# Patient Record
Sex: FEMALE | Race: WHITE | NOT HISPANIC OR LATINO | Employment: FULL TIME | URBAN - METROPOLITAN AREA
[De-identification: names, ages, dates, MRNs, and addresses within clinical notes are randomized per-mention and may not be internally consistent; named-entity substitution may affect disease eponyms.]

---

## 2021-05-24 ENCOUNTER — TRANSFERRED RECORDS (OUTPATIENT)
Dept: HEALTH INFORMATION MANAGEMENT | Facility: CLINIC | Age: 56
End: 2021-05-24

## 2021-05-24 LAB
ALT SERPL-CCNC: 18 IU/L (ref 6–31)
AST SERPL-CCNC: 22 IU/L (ref 10–40)
CREAT SERPL-MCNC: 0.72 MG/DL (ref 0.4–1)
GFR SERPL CREATININE-BSD FRML MDRD: >60 ML/MIN/1.73M2
GLUCOSE SERPL-MCNC: 144 MG/DL (ref 70–99)
INR PPP: 1.2 (ref 0.9–1.1)
POTASSIUM SERPL-SCNC: 4.9 MEQ/L (ref 3.4–5.1)

## 2021-05-26 ENCOUNTER — APPOINTMENT (OUTPATIENT)
Dept: GENERAL RADIOLOGY | Facility: CLINIC | Age: 56
End: 2021-05-26
Attending: SURGERY
Payer: COMMERCIAL

## 2021-05-26 ENCOUNTER — HOSPITAL ENCOUNTER (INPATIENT)
Facility: CLINIC | Age: 56
LOS: 3 days | Discharge: HOME | End: 2021-05-29
Attending: SURGERY | Admitting: SURGERY
Payer: COMMERCIAL

## 2021-05-26 ENCOUNTER — TRANSFERRED RECORDS (OUTPATIENT)
Dept: HEALTH INFORMATION MANAGEMENT | Facility: CLINIC | Age: 56
End: 2021-05-26

## 2021-05-26 DIAGNOSIS — K28.9 MARGINAL ULCER: Primary | ICD-10-CM

## 2021-05-26 LAB
ANION GAP SERPL CALCULATED.3IONS-SCNC: 6 MMOL/L (ref 3–14)
BUN SERPL-MCNC: 5 MG/DL (ref 7–30)
CALCIUM SERPL-MCNC: 8.5 MG/DL (ref 8.5–10.1)
CHLORIDE SERPL-SCNC: 109 MMOL/L (ref 94–109)
CO2 SERPL-SCNC: 25 MMOL/L (ref 20–32)
CREAT SERPL-MCNC: 0.52 MG/DL (ref 0.52–1.04)
ERYTHROCYTE [DISTWIDTH] IN BLOOD BY AUTOMATED COUNT: 12 % (ref 10–15)
GFR SERPL CREATININE-BSD FRML MDRD: >90 ML/MIN/{1.73_M2}
GLUCOSE SERPL-MCNC: 88 MG/DL (ref 70–99)
HCT VFR BLD AUTO: 26.6 % (ref 35–47)
HGB BLD-MCNC: 8.9 G/DL (ref 11.7–15.7)
INR PPP: 1.08 (ref 0.86–1.14)
MAGNESIUM SERPL-MCNC: 1.8 MG/DL (ref 1.6–2.3)
MCH RBC QN AUTO: 30.8 PG (ref 26.5–33)
MCHC RBC AUTO-ENTMCNC: 33.5 G/DL (ref 31.5–36.5)
MCV RBC AUTO: 92 FL (ref 78–100)
PHOSPHATE SERPL-MCNC: 3.1 MG/DL (ref 2.5–4.5)
PLATELET # BLD AUTO: 319 10E9/L (ref 150–450)
POTASSIUM SERPL-SCNC: 3.5 MMOL/L (ref 3.4–5.3)
RBC # BLD AUTO: 2.89 10E12/L (ref 3.8–5.2)
SODIUM SERPL-SCNC: 140 MMOL/L (ref 133–144)
WBC # BLD AUTO: 5.9 10E9/L (ref 4–11)

## 2021-05-26 PROCEDURE — C9113 INJ PANTOPRAZOLE SODIUM, VIA: HCPCS

## 2021-05-26 PROCEDURE — 84100 ASSAY OF PHOSPHORUS: CPT

## 2021-05-26 PROCEDURE — 80048 BASIC METABOLIC PNL TOTAL CA: CPT

## 2021-05-26 PROCEDURE — 71045 X-RAY EXAM CHEST 1 VIEW: CPT

## 2021-05-26 PROCEDURE — 120N000002 HC R&B MED SURG/OB UMMC

## 2021-05-26 PROCEDURE — 250N000013 HC RX MED GY IP 250 OP 250 PS 637

## 2021-05-26 PROCEDURE — 258N000003 HC RX IP 258 OP 636

## 2021-05-26 PROCEDURE — 250N000011 HC RX IP 250 OP 636

## 2021-05-26 PROCEDURE — 83735 ASSAY OF MAGNESIUM: CPT

## 2021-05-26 PROCEDURE — 71045 X-RAY EXAM CHEST 1 VIEW: CPT | Mod: 26 | Performed by: RADIOLOGY

## 2021-05-26 PROCEDURE — 36415 COLL VENOUS BLD VENIPUNCTURE: CPT

## 2021-05-26 PROCEDURE — 85027 COMPLETE CBC AUTOMATED: CPT

## 2021-05-26 PROCEDURE — 85610 PROTHROMBIN TIME: CPT

## 2021-05-26 RX ORDER — ONDANSETRON 2 MG/ML
4 INJECTION INTRAMUSCULAR; INTRAVENOUS EVERY 6 HOURS PRN
Status: DISCONTINUED | OUTPATIENT
Start: 2021-05-26 | End: 2021-05-29 | Stop reason: HOSPADM

## 2021-05-26 RX ORDER — SODIUM CHLORIDE, SODIUM LACTATE, POTASSIUM CHLORIDE, CALCIUM CHLORIDE 600; 310; 30; 20 MG/100ML; MG/100ML; MG/100ML; MG/100ML
INJECTION, SOLUTION INTRAVENOUS CONTINUOUS
Status: DISCONTINUED | OUTPATIENT
Start: 2021-05-26 | End: 2021-05-27

## 2021-05-26 RX ORDER — ONDANSETRON 4 MG/1
4 TABLET, ORALLY DISINTEGRATING ORAL EVERY 6 HOURS PRN
Status: DISCONTINUED | OUTPATIENT
Start: 2021-05-26 | End: 2021-05-29 | Stop reason: HOSPADM

## 2021-05-26 RX ORDER — HYDROMORPHONE HCL IN WATER/PF 6 MG/30 ML
.2-.4 PATIENT CONTROLLED ANALGESIA SYRINGE INTRAVENOUS
Status: DISCONTINUED | OUTPATIENT
Start: 2021-05-26 | End: 2021-05-29 | Stop reason: HOSPADM

## 2021-05-26 RX ORDER — NALOXONE HYDROCHLORIDE 0.4 MG/ML
0.2 INJECTION, SOLUTION INTRAMUSCULAR; INTRAVENOUS; SUBCUTANEOUS
Status: DISCONTINUED | OUTPATIENT
Start: 2021-05-26 | End: 2021-05-29 | Stop reason: HOSPADM

## 2021-05-26 RX ORDER — PIPERACILLIN SODIUM, TAZOBACTAM SODIUM 3; .375 G/15ML; G/15ML
3.38 INJECTION, POWDER, LYOPHILIZED, FOR SOLUTION INTRAVENOUS EVERY 6 HOURS
Status: DISCONTINUED | OUTPATIENT
Start: 2021-05-26 | End: 2021-05-27

## 2021-05-26 RX ORDER — NALOXONE HYDROCHLORIDE 0.4 MG/ML
0.4 INJECTION, SOLUTION INTRAMUSCULAR; INTRAVENOUS; SUBCUTANEOUS
Status: DISCONTINUED | OUTPATIENT
Start: 2021-05-26 | End: 2021-05-29 | Stop reason: HOSPADM

## 2021-05-26 RX ORDER — LIDOCAINE 4 G/G
1 PATCH TOPICAL
Status: DISCONTINUED | OUTPATIENT
Start: 2021-05-26 | End: 2021-05-29 | Stop reason: HOSPADM

## 2021-05-26 RX ADMIN — SODIUM CHLORIDE 8 MG/HR: 9 INJECTION, SOLUTION INTRAVENOUS at 23:40

## 2021-05-26 RX ADMIN — PIPERACILLIN SODIUM AND TAZOBACTAM SODIUM 3.38 G: 3; .375 INJECTION, POWDER, LYOPHILIZED, FOR SOLUTION INTRAVENOUS at 23:41

## 2021-05-26 RX ADMIN — LIDOCAINE 1 PATCH: 246 PATCH TOPICAL at 23:41

## 2021-05-26 RX ADMIN — HYDROMORPHONE HYDROCHLORIDE 0.2 MG: 0.2 INJECTION, SOLUTION INTRAMUSCULAR; INTRAVENOUS; SUBCUTANEOUS at 23:40

## 2021-05-26 RX ADMIN — SODIUM CHLORIDE, POTASSIUM CHLORIDE, SODIUM LACTATE AND CALCIUM CHLORIDE: 600; 310; 30; 20 INJECTION, SOLUTION INTRAVENOUS at 23:40

## 2021-05-26 ASSESSMENT — MIFFLIN-ST. JEOR: SCORE: 1353

## 2021-05-26 NOTE — H&P
"SURGERY ADMISSION HISTORY & PHYSICAL  Ngoc Azar MRN# [id number]   YOB: 1965 Age: 55 year old     Date of Admission: 05/26/21    CC: Upper GI Bleed    HPI: Ngoc Azar is a 55 year old year old female with a a previous Bruce-en-Y in 2011 and revised in 2020 due to a perorated gastrojejunal anastomosis ulcer and an open cholecystectomy in 2020, who presented to OSH on 5/24 with hematemesis and hematochezia resulting in hypotension, found to have a marginal ulcer at R-E-Y anastomosis. At outside hospital patient did not require blood transfusions. She was started on a PPI drip and had an EGD which showed moderate stenosis and a cratered marginal ulcer on jejunal aspect of the anastomosis with adherent clot and likely underlying vessel extending into sinus cavity near bruce limb. Currently she denies any feelings of chest pain, SOB, dizziness, nausea, vomiting, abdominal pain or diarrhea. She has not had any more episodes of hematochezia or hematemesis. She has a     REVIEW OF SYSTEMS: Negative unless otherwise specified in HPI    PAST MEDICAL HISTORY: Depression, Previous R-E-Y with revisions for ulcers    PAST SURGICAL HISTORY: R-E-Y in 2011, revision due to ulcer in 2020; open cholecystectomy in 2020.    ALLERGIES:  Sulfa drugs    HOME MEDICATIONS:   Tylenol 500mg TID prn  Atenolol 50mg Dialy  Rexulti 0.5mg qevening  Ferrous sulfate 325mg BID  Prozac 40mg daily  Atarax 50mg qevening  Oxycodone 5mg q4 prn  Prilosec 20mg BID      SOCIAL HISTORY:   Former Smoker 0.25 packs/day  Alcohol 2-3 drinks a week  No other drugs    FAMILY HISTORY: No family history on file.    PHYSICAL EXAMINATION:  BP (!) (P) 143/79   Pulse 82   Temp 97.9  F (36.6  C) (Oral)   Resp 16   Ht 1.626 m (5' 4\")   Wt 77.8 kg (171 lb 8.3 oz)   SpO2 97%   BMI 29.44 kg/m       Awake and alert  RRR  Non labored breathing  Abdomen is soft, non distended, non tender, previous well healed R subcostal incision and midline " incision.    LABS:  CBC RESULTS:   Recent Labs   Lab Test 05/26/21  2250   WBC 5.9   RBC 2.89*   HGB 8.9*   HCT 26.6*   MCV 92   MCH 30.8   MCHC 33.5   RDW 12.0        Last Comprehensive Metabolic Panel:  Sodium   Date Value Ref Range Status   05/26/2021 140 133 - 144 mmol/L Final     Potassium   Date Value Ref Range Status   05/26/2021 3.5 3.4 - 5.3 mmol/L Final     Chloride   Date Value Ref Range Status   05/26/2021 109 94 - 109 mmol/L Final     Carbon Dioxide   Date Value Ref Range Status   05/26/2021 25 20 - 32 mmol/L Final     Anion Gap   Date Value Ref Range Status   05/26/2021 6 3 - 14 mmol/L Final     Glucose   Date Value Ref Range Status   05/26/2021 88 70 - 99 mg/dL Final     Urea Nitrogen   Date Value Ref Range Status   05/26/2021 5 (L) 7 - 30 mg/dL Final     Creatinine   Date Value Ref Range Status   05/26/2021 0.52 0.52 - 1.04 mg/dL Final     GFR Estimate   Date Value Ref Range Status   05/26/2021 >90 >60 mL/min/[1.73_m2] Final     Comment:     Non  GFR Calc  Starting 12/18/2018, serum creatinine based estimated GFR (eGFR) will be   calculated using the Chronic Kidney Disease Epidemiology Collaboration   (CKD-EPI) equation.       Calcium   Date Value Ref Range Status   05/26/2021 8.5 8.5 - 10.1 mg/dL Final         IMAGING:  CT abdomen/pelvis w/IV contrast 5/24/21 at OSH:  FINDINGS: Postoperative changes Eve-en-Y gastric bypass. There is a focal outpouching involving the proximal Eve limb near the gastroenterostomy. There is mild fat stranding and edema about this. No sid free fluid. No extraluminal gas.    Normal caliber small bowel. Normal appendix. Surgical clips about the gallbladder. Punctate right renal calyceal stone. No hydronephrosis. Renal cortical cysts. Liver, spleen, adrenal glands and pancreas are negative. Scattered arterial calcifications.    IMPRESSION: Focal outpouching of the proximal Eve limb near the gastroenterostomy. There is some mild inflammatory  change about this. Findings are suspicious for a marginal ulcer. No free fluid or free air. Recommend EGD for further evaluation.    A/P: Ngoc Azar is a 55 year old female with a hx of a R-E-Y in 2011 with revision in 2020, who presented to OSH on 5/24/21 after episodes of hematemesis and hematochezia with dizziness per patient. She was started on PPI drip at OSH and underwent an EGD which showed moderate stenosis and a cratered marginal ulcer on jejunal aspect of the anastomosis with adherent clot and likely underlying vessel extending into sinus cavity near bruce limb. Patient has not had any more episodes of hematochezia or hematemesis, and has not required blood transfusions. She has been transferred to [facility] for further evaluation given complex bariatric history.    - Admit to surgery  - Strict NPO  - IV dilaudid for pain  - PPI drip  - Lidocaine patches for pain  - Labs now  - d5 1/2 NS + 20k  @100  - IV zosyn    Discussed with rajani resident Dr. Todd. Also discussed  With chief resident Dr. Martel who will discuss with staff     [place]

## 2021-05-27 LAB
GLUCOSE BLDC GLUCOMTR-MCNC: 107 MG/DL (ref 70–99)
GLUCOSE BLDC GLUCOMTR-MCNC: 110 MG/DL (ref 70–99)
GLUCOSE BLDC GLUCOMTR-MCNC: 113 MG/DL (ref 70–99)
GLUCOSE BLDC GLUCOMTR-MCNC: 118 MG/DL (ref 70–99)
GLUCOSE BLDC GLUCOMTR-MCNC: 119 MG/DL (ref 70–99)
UPPER GI ENDOSCOPY: NORMAL

## 2021-05-27 PROCEDURE — 250N000011 HC RX IP 250 OP 636: Performed by: SURGERY

## 2021-05-27 PROCEDURE — 999N001017 HC STATISTIC GLUCOSE BY METER IP

## 2021-05-27 PROCEDURE — 250N000013 HC RX MED GY IP 250 OP 250 PS 637

## 2021-05-27 PROCEDURE — 43235 EGD DIAGNOSTIC BRUSH WASH: CPT | Performed by: SURGERY

## 2021-05-27 PROCEDURE — 0DJ08ZZ INSPECTION OF UPPER INTESTINAL TRACT, VIA NATURAL OR ARTIFICIAL OPENING ENDOSCOPIC: ICD-10-PCS | Performed by: SURGERY

## 2021-05-27 PROCEDURE — C9113 INJ PANTOPRAZOLE SODIUM, VIA: HCPCS

## 2021-05-27 PROCEDURE — 120N000002 HC R&B MED SURG/OB UMMC

## 2021-05-27 PROCEDURE — G0500 MOD SEDAT ENDO SERVICE >5YRS: HCPCS | Performed by: SURGERY

## 2021-05-27 PROCEDURE — 250N000009 HC RX 250: Performed by: SURGERY

## 2021-05-27 PROCEDURE — 258N000003 HC RX IP 258 OP 636

## 2021-05-27 PROCEDURE — 250N000011 HC RX IP 250 OP 636

## 2021-05-27 RX ORDER — HYDRALAZINE HYDROCHLORIDE 20 MG/ML
10 INJECTION INTRAMUSCULAR; INTRAVENOUS EVERY 6 HOURS PRN
Status: DISCONTINUED | OUTPATIENT
Start: 2021-05-27 | End: 2021-05-29 | Stop reason: HOSPADM

## 2021-05-27 RX ORDER — DEXTROSE MONOHYDRATE, SODIUM CHLORIDE, AND POTASSIUM CHLORIDE 50; 1.49; 4.5 G/1000ML; G/1000ML; G/1000ML
INJECTION, SOLUTION INTRAVENOUS CONTINUOUS
Status: DISCONTINUED | OUTPATIENT
Start: 2021-05-27 | End: 2021-05-29 | Stop reason: HOSPADM

## 2021-05-27 RX ORDER — FENTANYL CITRATE 50 UG/ML
INJECTION, SOLUTION INTRAMUSCULAR; INTRAVENOUS PRN
Status: COMPLETED | OUTPATIENT
Start: 2021-05-27 | End: 2021-05-27

## 2021-05-27 RX ORDER — DIPHENHYDRAMINE HYDROCHLORIDE 50 MG/ML
25 INJECTION INTRAMUSCULAR; INTRAVENOUS
Status: DISCONTINUED | OUTPATIENT
Start: 2021-05-27 | End: 2021-05-29 | Stop reason: HOSPADM

## 2021-05-27 RX ADMIN — FENTANYL CITRATE 100 MCG: 50 INJECTION, SOLUTION INTRAMUSCULAR; INTRAVENOUS at 11:15

## 2021-05-27 RX ADMIN — HYDROMORPHONE HYDROCHLORIDE 0.4 MG: 0.2 INJECTION, SOLUTION INTRAMUSCULAR; INTRAVENOUS; SUBCUTANEOUS at 01:41

## 2021-05-27 RX ADMIN — POTASSIUM CHLORIDE, DEXTROSE MONOHYDRATE AND SODIUM CHLORIDE: 150; 5; 450 INJECTION, SOLUTION INTRAVENOUS at 10:22

## 2021-05-27 RX ADMIN — PIPERACILLIN SODIUM AND TAZOBACTAM SODIUM 3.38 G: 3; .375 INJECTION, POWDER, LYOPHILIZED, FOR SOLUTION INTRAVENOUS at 05:15

## 2021-05-27 RX ADMIN — LIDOCAINE 1 PATCH: 246 PATCH TOPICAL at 21:31

## 2021-05-27 RX ADMIN — PIPERACILLIN SODIUM AND TAZOBACTAM SODIUM 3.38 G: 3; .375 INJECTION, POWDER, LYOPHILIZED, FOR SOLUTION INTRAVENOUS at 10:15

## 2021-05-27 RX ADMIN — MIDAZOLAM 1 MG: 1 INJECTION INTRAMUSCULAR; INTRAVENOUS at 11:18

## 2021-05-27 RX ADMIN — PANTOPRAZOLE SODIUM 80 MG: 40 INJECTION, POWDER, FOR SOLUTION INTRAVENOUS at 13:36

## 2021-05-27 RX ADMIN — POTASSIUM CHLORIDE, DEXTROSE MONOHYDRATE AND SODIUM CHLORIDE: 150; 5; 450 INJECTION, SOLUTION INTRAVENOUS at 00:52

## 2021-05-27 RX ADMIN — POTASSIUM CHLORIDE, DEXTROSE MONOHYDRATE AND SODIUM CHLORIDE: 150; 5; 450 INJECTION, SOLUTION INTRAVENOUS at 21:11

## 2021-05-27 RX ADMIN — TOPICAL ANESTHETIC 1 SPRAY: 200 SPRAY DENTAL; PERIODONTAL at 11:14

## 2021-05-27 RX ADMIN — HYDROMORPHONE HYDROCHLORIDE 0.4 MG: 0.2 INJECTION, SOLUTION INTRAMUSCULAR; INTRAVENOUS; SUBCUTANEOUS at 08:58

## 2021-05-27 RX ADMIN — PANTOPRAZOLE SODIUM 80 MG: 40 INJECTION, POWDER, FOR SOLUTION INTRAVENOUS at 21:10

## 2021-05-27 RX ADMIN — SODIUM CHLORIDE 8 MG/HR: 9 INJECTION, SOLUTION INTRAVENOUS at 06:53

## 2021-05-27 RX ADMIN — MIDAZOLAM 1 MG: 1 INJECTION INTRAMUSCULAR; INTRAVENOUS at 11:30

## 2021-05-27 RX ADMIN — MIDAZOLAM 1 MG: 1 INJECTION INTRAMUSCULAR; INTRAVENOUS at 11:24

## 2021-05-27 RX ADMIN — MIDAZOLAM 2 MG: 1 INJECTION INTRAMUSCULAR; INTRAVENOUS at 11:15

## 2021-05-27 ASSESSMENT — MIFFLIN-ST. JEOR: SCORE: 1342.49

## 2021-05-27 ASSESSMENT — ACTIVITIES OF DAILY LIVING (ADL)
ADLS_ACUITY_SCORE: 17
ADLS_ACUITY_SCORE: 15

## 2021-05-27 NOTE — PLAN OF CARE
"BP (!) 146/78 (BP Location: Right arm)   Pulse 70   Temp 97.3  F (36.3  C) (Oral)   Resp 16   Ht 1.626 m (5' 4\")   Wt 77.8 kg (171 lb 8.3 oz)   SpO2 100%   BMI 29.44 kg/m      Had EGD today in Endoscopy Department     Neuro: A&Ox4.   Cardiac: AVSS.   Respiratory: Sating 100% on RA.  GI/: Good urine output. No BM  Diet/appetite: Strict NPO  Activity:  Up to bathroom to void independently   Pain: Back and headache pain managed with dilaudid IV 0.4mg - given x 1  Skin: Old scar on abdomen Scabs on bilateral shin   LDA's:  PIV x2 with D5 1/2 NS + 20KCL at 100ml/hr   ,110   Plan for another EGD tomorrow in the OR    Plan: Continue with POC. Notify primary team with changes.      "

## 2021-05-27 NOTE — PROGRESS NOTES
Surgery Progress Note  5/27/2021     Subjective:  -pain well controlled. No N/V. UGI perforation repaired outside hospital in the past, pt unsure where it was, but it was done at [place]. VS wnl.     Objective:  Temp:  [97.9  F (36.6  C)] 97.9  F (36.6  C)  Pulse:  [82] 82  Resp:  [16] 16  BP: (P) 143/79  SpO2:  [97 %] 97 %  No intake/output data recorded.    Gen: Awake, alert, NAD   Resp: NLB on RA  Abd: Soft, non-distended, non-tender  Ext: WWP      BMP  Recent Labs   Lab 05/26/21  2250      POTASSIUM 3.5   CHLORIDE 109   KARINE 8.5   CO2 25   BUN 5*   CR 0.52   GLC 88   MAG 1.8   PHOS 3.1     CBC  Recent Labs   Lab 05/26/21  2250   WBC 5.9   RBC 2.89*   HGB 8.9*   HCT 26.6*   MCV 92   MCH 30.8   MCHC 33.5   RDW 12.0        INR  Recent Labs   Lab 05/26/21  2250   INR 1.08      AST/ALT & Alk PhosNo lab results found in last 7 days.  BiliNo results for input(s): BILITOTAL, DBIL in the last [id number] hours.  Lipase/AmlyaseNo lab results found in last 7 days.    A/P: Ngoc Azar is a 55 year old female with PMH of RNYGB 2011 w/ revision in 2020 who presents as a transfer from OSH on 5/27 with hematemesis and found to have a marginal ulcer. EGD showed moderate stenosis and a cratered marginal ulcer on jejunal aspect of anastomosis w/ adherent clot and likely underlying vessel extending into sinus cavity near bruce limb. Transferred to [facility] for complex bariatric history.     - Plan for EGD today at 11 AM  - PPI gtt  - Strict NPO, IVF  - Pain control  - Discontinue zosyn    Seen with chief, Dr. Pan who discussed with staff.    Antonio Lopes MD  EGS PGY2

## 2021-05-27 NOTE — OR NURSING
Procedure: Upper endoscopy using endo scissors and rat tooth to remove suture. MD will take patient to the OR on Friday to remove more of the suture.   Sedation: Conscious sedation (100 mcg fentanyl, 5 mg versed)  O2: 2 LPM NC during procedure; room air  Tolerated: VS stable during and post procedure. Not c/o abdominal or chest pain.  Report: Given to   Flora Carrillo RN     Registered Nurse     Since 5/27/2021     [id number]   Patient escorted to and from endoscopy clinic via litter by patient transport    Sugey Strauss RN

## 2021-05-27 NOTE — PLAN OF CARE
"BP (!) (P) 143/79   Pulse 82   Temp 97.9  F (36.6  C) (Oral)   Resp 16   Ht 1.626 m (5' 4\")   Wt 77.8 kg (171 lb 8.3 oz)   SpO2 97%   BMI 29.44 kg/m      Shift- [id number]    Neuros:A&Ox4. Very calm and pleasant,    Cardiac: WDL. Denies any chest pain.  Respiratory: WDL. LS clear and equal. Denies SOB.   GI/Gu: Voiding spontaneously.Denies any abdominal pain. LBM was 5/25 before she was brought here.   Skin/Incisions: Has some scabs bilaterally on shins.   Pain: Pain in lower back and hip pain. PRN dilaudid and lidocaine patch in place.   Diet: Strict NPO.   Activity: Independent.  Plan:  Continue with POC. Waiting for plan.   "

## 2021-05-27 NOTE — UTILIZATION REVIEW
Inpatient appropriate    Admission Status; Secondary Review Determination      Under the authority of the Utilization Management Committee, the utilization review process indicated a secondary review on the above patient. The review outcome is based on review of the medical records, discussions with staff, and applying clinical experience noted on the date of the review.    (x) Inpatient Status Appropriate - This patient's medical care is consistent with medical management for inpatient care and reasonable inpatient medical practice.    RATIONALE FOR DETERMINATION  55-year-old female with previous Eve-en-Y in 2011 and revision in 2020 due to a perforated gastrojejunal anastomosis ulcer presented initially to outside hospital with hematemesis and hematochezia, was found to have moderate stenosis and a cratered marginal ulcer on general aspect of the anastomosis with adherent clot and likely underlying vessel extending into sinus cavity near the Eve limb.  Given her past complex bariatric surgical history she was transferred to [facility] where she was admitted on 5/26/2021.  Patient is currently on IV fluids, IV Protonix drip and IV Zosyn.  Repeat endoscopy is being planned for today.  She will need ongoing hospitalization for close monitoring of hemoglobin, and likely will need revision of her Eve-en-Y.  Inpatient care is appropriate at this time.    At the time of admission with the information available to the attending physician more than 2 nights Hospital complex care was anticipated, based on patient risk of adverse outcome if treated as outpatient and complex care required. Inpatient admission is appropriate based on the Medicare guidelines.    This document was produced using voice recognition software      The information on this document is developed by the utilization review team in order for the business office to ensure compliance. This only denotes the appropriateness of proper admission status and does  not reflect the quality of care rendered.  The definitions of Inpatient Status and Observation Status used in making the determination above are those provided in the CMS Coverage Manual, Chapter 1 and Chapter 6, section 70.4.    Sincerely,    Utilization Review  Physician Advisor  [facility].

## 2021-05-27 NOTE — PROGRESS NOTES
Admitted/transferred from: [facility]  2 RN full   skin assessment completed by Jessica Ramos, IMANI and Ulisses Sepulveda, RN.  Skin assessment finding: issues found Small scabs on shins bilaterally.    Interventions/actions: other None.      Will continue to monitor.

## 2021-05-28 ENCOUNTER — ANESTHESIA (OUTPATIENT)
Dept: SURGERY | Facility: CLINIC | Age: 56
End: 2021-05-28
Payer: COMMERCIAL

## 2021-05-28 ENCOUNTER — ANESTHESIA EVENT (OUTPATIENT)
Dept: SURGERY | Facility: CLINIC | Age: 56
End: 2021-05-28
Payer: COMMERCIAL

## 2021-05-28 LAB
ANION GAP SERPL CALCULATED.3IONS-SCNC: 4 MMOL/L (ref 3–14)
BUN SERPL-MCNC: 2 MG/DL (ref 7–30)
CALCIUM SERPL-MCNC: 8.4 MG/DL (ref 8.5–10.1)
CHLORIDE SERPL-SCNC: 107 MMOL/L (ref 94–109)
CO2 SERPL-SCNC: 27 MMOL/L (ref 20–32)
CREAT SERPL-MCNC: 0.64 MG/DL (ref 0.52–1.04)
ERYTHROCYTE [DISTWIDTH] IN BLOOD BY AUTOMATED COUNT: 12.2 % (ref 10–15)
GFR SERPL CREATININE-BSD FRML MDRD: >90 ML/MIN/{1.73_M2}
GLUCOSE BLDC GLUCOMTR-MCNC: 113 MG/DL (ref 70–99)
GLUCOSE BLDC GLUCOMTR-MCNC: 134 MG/DL (ref 70–99)
GLUCOSE BLDC GLUCOMTR-MCNC: 162 MG/DL (ref 70–99)
GLUCOSE SERPL-MCNC: 110 MG/DL (ref 70–99)
HCT VFR BLD AUTO: 27.3 % (ref 35–47)
HGB BLD-MCNC: 9.1 G/DL (ref 11.7–15.7)
MAGNESIUM SERPL-MCNC: 1.8 MG/DL (ref 1.6–2.3)
MCH RBC QN AUTO: 31 PG (ref 26.5–33)
MCHC RBC AUTO-ENTMCNC: 33.3 G/DL (ref 31.5–36.5)
MCV RBC AUTO: 93 FL (ref 78–100)
PHOSPHATE SERPL-MCNC: 4.1 MG/DL (ref 2.5–4.5)
PLATELET # BLD AUTO: 333 10E9/L (ref 150–450)
POTASSIUM SERPL-SCNC: 3.8 MMOL/L (ref 3.4–5.3)
RBC # BLD AUTO: 2.94 10E12/L (ref 3.8–5.2)
SODIUM SERPL-SCNC: 138 MMOL/L (ref 133–144)
WBC # BLD AUTO: 4.3 10E9/L (ref 4–11)

## 2021-05-28 PROCEDURE — 258N000003 HC RX IP 258 OP 636

## 2021-05-28 PROCEDURE — 88304 TISSUE EXAM BY PATHOLOGIST: CPT | Mod: 26 | Performed by: PATHOLOGY

## 2021-05-28 PROCEDURE — 360N000075 HC SURGERY LEVEL 2, PER MIN: Performed by: SURGERY

## 2021-05-28 PROCEDURE — 370N000017 HC ANESTHESIA TECHNICAL FEE, PER MIN: Performed by: SURGERY

## 2021-05-28 PROCEDURE — 0DCA8ZZ EXTIRPATION OF MATTER FROM JEJUNUM, VIA NATURAL OR ARTIFICIAL OPENING ENDOSCOPIC: ICD-10-PCS | Performed by: SURGERY

## 2021-05-28 PROCEDURE — 36415 COLL VENOUS BLD VENIPUNCTURE: CPT

## 2021-05-28 PROCEDURE — 272N000002 HC OR SUPPLY OTHER OPNP: Performed by: SURGERY

## 2021-05-28 PROCEDURE — 83735 ASSAY OF MAGNESIUM: CPT

## 2021-05-28 PROCEDURE — 80048 BASIC METABOLIC PNL TOTAL CA: CPT

## 2021-05-28 PROCEDURE — 250N000011 HC RX IP 250 OP 636

## 2021-05-28 PROCEDURE — 999N001017 HC STATISTIC GLUCOSE BY METER IP

## 2021-05-28 PROCEDURE — 710N000010 HC RECOVERY PHASE 1, LEVEL 2, PER MIN: Performed by: SURGERY

## 2021-05-28 PROCEDURE — 85027 COMPLETE CBC AUTOMATED: CPT

## 2021-05-28 PROCEDURE — 84100 ASSAY OF PHOSPHORUS: CPT

## 2021-05-28 PROCEDURE — 88304 TISSUE EXAM BY PATHOLOGIST: CPT | Mod: TC | Performed by: SURGERY

## 2021-05-28 PROCEDURE — 250N000011 HC RX IP 250 OP 636: Performed by: NURSE ANESTHETIST, CERTIFIED REGISTERED

## 2021-05-28 PROCEDURE — 272N000001 HC OR GENERAL SUPPLY STERILE: Performed by: SURGERY

## 2021-05-28 PROCEDURE — 120N000002 HC R&B MED SURG/OB UMMC

## 2021-05-28 PROCEDURE — 250N000013 HC RX MED GY IP 250 OP 250 PS 637

## 2021-05-28 PROCEDURE — 258N000003 HC RX IP 258 OP 636: Performed by: NURSE ANESTHETIST, CERTIFIED REGISTERED

## 2021-05-28 PROCEDURE — 250N000009 HC RX 250: Performed by: NURSE ANESTHETIST, CERTIFIED REGISTERED

## 2021-05-28 PROCEDURE — C9113 INJ PANTOPRAZOLE SODIUM, VIA: HCPCS

## 2021-05-28 PROCEDURE — 999N000141 HC STATISTIC PRE-PROCEDURE NURSING ASSESSMENT: Performed by: SURGERY

## 2021-05-28 RX ORDER — HYDROXYZINE HYDROCHLORIDE 10 MG/1
10 TABLET, FILM COATED ORAL 2 TIMES DAILY PRN
COMMUNITY
Start: 2020-09-14

## 2021-05-28 RX ORDER — ACETAMINOPHEN 500 MG
1000 TABLET ORAL EVERY 6 HOURS PRN
COMMUNITY
Start: 2020-09-16

## 2021-05-28 RX ORDER — LIDOCAINE HYDROCHLORIDE 20 MG/ML
INJECTION, SOLUTION INFILTRATION; PERINEURAL PRN
Status: DISCONTINUED | OUTPATIENT
Start: 2021-05-28 | End: 2021-05-28

## 2021-05-28 RX ORDER — ONDANSETRON 2 MG/ML
INJECTION INTRAMUSCULAR; INTRAVENOUS PRN
Status: DISCONTINUED | OUTPATIENT
Start: 2021-05-28 | End: 2021-05-28

## 2021-05-28 RX ORDER — MULTIVITAMIN WITH FOLIC ACID 400 MCG
1 TABLET ORAL DAILY
COMMUNITY
Start: 2020-11-12

## 2021-05-28 RX ORDER — SODIUM CHLORIDE, SODIUM LACTATE, POTASSIUM CHLORIDE, CALCIUM CHLORIDE 600; 310; 30; 20 MG/100ML; MG/100ML; MG/100ML; MG/100ML
INJECTION, SOLUTION INTRAVENOUS CONTINUOUS PRN
Status: DISCONTINUED | OUTPATIENT
Start: 2021-05-28 | End: 2021-05-28

## 2021-05-28 RX ORDER — ONDANSETRON 4 MG/1
4 TABLET, ORALLY DISINTEGRATING ORAL EVERY 30 MIN PRN
Status: DISCONTINUED | OUTPATIENT
Start: 2021-05-28 | End: 2021-05-28 | Stop reason: HOSPADM

## 2021-05-28 RX ORDER — SUCRALFATE 1 G/1
1 TABLET ORAL
Status: DISCONTINUED | OUTPATIENT
Start: 2021-05-28 | End: 2021-05-29 | Stop reason: HOSPADM

## 2021-05-28 RX ORDER — HYDROXYZINE HYDROCHLORIDE 25 MG/1
50 TABLET, FILM COATED ORAL AT BEDTIME
Status: DISCONTINUED | OUTPATIENT
Start: 2021-05-28 | End: 2021-05-29 | Stop reason: HOSPADM

## 2021-05-28 RX ORDER — ATENOLOL 50 MG/1
50 TABLET ORAL DAILY
COMMUNITY
Start: 2021-04-22

## 2021-05-28 RX ORDER — HYDROXYZINE HYDROCHLORIDE 50 MG/1
50 TABLET, FILM COATED ORAL AT BEDTIME
COMMUNITY
Start: 2020-11-10

## 2021-05-28 RX ORDER — FERROUS SULFATE 325(65) MG
325 TABLET, DELAYED RELEASE (ENTERIC COATED) ORAL DAILY
COMMUNITY
Start: 2020-04-06

## 2021-05-28 RX ORDER — PROPOFOL 10 MG/ML
INJECTION, EMULSION INTRAVENOUS PRN
Status: DISCONTINUED | OUTPATIENT
Start: 2021-05-28 | End: 2021-05-28

## 2021-05-28 RX ORDER — FENTANYL CITRATE 50 UG/ML
INJECTION, SOLUTION INTRAMUSCULAR; INTRAVENOUS PRN
Status: DISCONTINUED | OUTPATIENT
Start: 2021-05-28 | End: 2021-05-28

## 2021-05-28 RX ORDER — FENTANYL CITRATE 50 UG/ML
25-50 INJECTION, SOLUTION INTRAMUSCULAR; INTRAVENOUS
Status: DISCONTINUED | OUTPATIENT
Start: 2021-05-28 | End: 2021-05-28 | Stop reason: HOSPADM

## 2021-05-28 RX ORDER — FLUOXETINE 40 MG/1
40 CAPSULE ORAL DAILY
COMMUNITY
Start: 2021-03-16

## 2021-05-28 RX ORDER — ATENOLOL 50 MG/1
50 TABLET ORAL DAILY
Status: DISCONTINUED | OUTPATIENT
Start: 2021-05-28 | End: 2021-05-29 | Stop reason: HOSPADM

## 2021-05-28 RX ORDER — ONDANSETRON 2 MG/ML
4 INJECTION INTRAMUSCULAR; INTRAVENOUS EVERY 30 MIN PRN
Status: DISCONTINUED | OUTPATIENT
Start: 2021-05-28 | End: 2021-05-28 | Stop reason: HOSPADM

## 2021-05-28 RX ORDER — DEXAMETHASONE SODIUM PHOSPHATE 4 MG/ML
INJECTION, SOLUTION INTRA-ARTICULAR; INTRALESIONAL; INTRAMUSCULAR; INTRAVENOUS; SOFT TISSUE PRN
Status: DISCONTINUED | OUTPATIENT
Start: 2021-05-28 | End: 2021-05-28

## 2021-05-28 RX ORDER — SODIUM CHLORIDE, SODIUM LACTATE, POTASSIUM CHLORIDE, CALCIUM CHLORIDE 600; 310; 30; 20 MG/100ML; MG/100ML; MG/100ML; MG/100ML
INJECTION, SOLUTION INTRAVENOUS CONTINUOUS
Status: DISCONTINUED | OUTPATIENT
Start: 2021-05-28 | End: 2021-05-28 | Stop reason: HOSPADM

## 2021-05-28 RX ORDER — PROPOFOL 10 MG/ML
INJECTION, EMULSION INTRAVENOUS CONTINUOUS PRN
Status: DISCONTINUED | OUTPATIENT
Start: 2021-05-28 | End: 2021-05-28

## 2021-05-28 RX ADMIN — PROPOFOL 30 MG: 10 INJECTION, EMULSION INTRAVENOUS at 08:37

## 2021-05-28 RX ADMIN — MIDAZOLAM 2 MG: 1 INJECTION INTRAMUSCULAR; INTRAVENOUS at 07:23

## 2021-05-28 RX ADMIN — LIDOCAINE HYDROCHLORIDE 80 MG: 20 INJECTION, SOLUTION INFILTRATION; PERINEURAL at 07:43

## 2021-05-28 RX ADMIN — SUCRALFATE 1 G: 1 TABLET ORAL at 17:46

## 2021-05-28 RX ADMIN — SUCRALFATE 1 G: 1 TABLET ORAL at 11:09

## 2021-05-28 RX ADMIN — PROPOFOL 150 MG: 10 INJECTION, EMULSION INTRAVENOUS at 07:43

## 2021-05-28 RX ADMIN — ROCURONIUM BROMIDE 40 MG: 10 INJECTION INTRAVENOUS at 07:43

## 2021-05-28 RX ADMIN — PANTOPRAZOLE SODIUM 80 MG: 40 INJECTION, POWDER, FOR SOLUTION INTRAVENOUS at 19:45

## 2021-05-28 RX ADMIN — PROPOFOL 20 MG: 10 INJECTION, EMULSION INTRAVENOUS at 08:19

## 2021-05-28 RX ADMIN — HYDROXYZINE HYDROCHLORIDE 50 MG: 25 TABLET, FILM COATED ORAL at 21:52

## 2021-05-28 RX ADMIN — ONDANSETRON 4 MG: 2 INJECTION INTRAMUSCULAR; INTRAVENOUS at 08:52

## 2021-05-28 RX ADMIN — POTASSIUM CHLORIDE, DEXTROSE MONOHYDRATE AND SODIUM CHLORIDE: 150; 5; 450 INJECTION, SOLUTION INTRAVENOUS at 11:09

## 2021-05-28 RX ADMIN — PROPOFOL 150 MCG/KG/MIN: 10 INJECTION, EMULSION INTRAVENOUS at 07:47

## 2021-05-28 RX ADMIN — FENTANYL CITRATE 100 MCG: 50 INJECTION, SOLUTION INTRAMUSCULAR; INTRAVENOUS at 07:43

## 2021-05-28 RX ADMIN — FENTANYL CITRATE 100 MCG: 50 INJECTION, SOLUTION INTRAMUSCULAR; INTRAVENOUS at 08:21

## 2021-05-28 RX ADMIN — SUCRALFATE 1 G: 1 TABLET ORAL at 21:52

## 2021-05-28 RX ADMIN — FENTANYL CITRATE 50 MCG: 50 INJECTION, SOLUTION INTRAMUSCULAR; INTRAVENOUS at 08:36

## 2021-05-28 RX ADMIN — SODIUM CHLORIDE, POTASSIUM CHLORIDE, SODIUM LACTATE AND CALCIUM CHLORIDE: 600; 310; 30; 20 INJECTION, SOLUTION INTRAVENOUS at 07:30

## 2021-05-28 RX ADMIN — ROCURONIUM BROMIDE 10 MG: 10 INJECTION INTRAVENOUS at 08:19

## 2021-05-28 RX ADMIN — SUGAMMADEX 200 MG: 100 INJECTION, SOLUTION INTRAVENOUS at 08:56

## 2021-05-28 RX ADMIN — ATENOLOL 50 MG: 50 TABLET ORAL at 15:20

## 2021-05-28 RX ADMIN — DEXAMETHASONE SODIUM PHOSPHATE 8 MG: 4 INJECTION, SOLUTION INTRA-ARTICULAR; INTRALESIONAL; INTRAMUSCULAR; INTRAVENOUS; SOFT TISSUE at 07:55

## 2021-05-28 RX ADMIN — POTASSIUM CHLORIDE, DEXTROSE MONOHYDRATE AND SODIUM CHLORIDE: 150; 5; 450 INJECTION, SOLUTION INTRAVENOUS at 19:46

## 2021-05-28 ASSESSMENT — ACTIVITIES OF DAILY LIVING (ADL)
ADLS_ACUITY_SCORE: 15

## 2021-05-28 ASSESSMENT — MIFFLIN-ST. JEOR: SCORE: 1341.13

## 2021-05-28 NOTE — ANESTHESIA PREPROCEDURE EVALUATION
"Anesthesia Pre-Procedure Evaluation    Patient: Ngoc Azar   MRN: [id number] : 1965        Preoperative Diagnosis: Marginal ulcer [K28.9]   Procedure : Procedure(s):  ESOPHAGOGASTRODUODENOSCOPY, WITH FOREIGN BODY REMOVAL     Past Medical History:   Diagnosis Date     Depressive disorder      Obese       Past Surgical History:   Procedure Laterality Date     CHOLECYSTECTOMY       MANDO EN Y BOWEL        Allergies   Allergen Reactions     Sulfa Drugs Anaphylaxis     Happened when she was a infant      Social History     Tobacco Use     Smoking status: Former Smoker     Smokeless tobacco: Former User   Substance Use Topics     Alcohol use: Yes     Comment: \"Very occasional\"      Wt Readings from Last 1 Encounters:   21 76.7 kg (169 lb 3.2 oz)        Anesthesia Evaluation   Pt has had prior anesthetic. Type: General.    History of anesthetic complications  - PONV.      ROS/MED HX  ENT/Pulmonary:       Neurologic:       Cardiovascular:       METS/Exercise Tolerance:     Hematologic:       Musculoskeletal:       GI/Hepatic:       Renal/Genitourinary:       Endo:       Psychiatric/Substance Use:       Infectious Disease:       Malignancy:       Other:            Physical Exam    Airway             Respiratory Devices and Support         Dental       (+) upper dentures and lower dentures      Cardiovascular   cardiovascular exam normal          Pulmonary   pulmonary exam normal                OUTSIDE LABS:  CBC:   Lab Results   Component Value Date    WBC 5.9 2021    HGB 8.9 (L) 2021    HCT 26.6 (L) 2021     2021     BMP:   Lab Results   Component Value Date     2021    POTASSIUM 3.5 2021    CHLORIDE 109 2021    CO2 25 2021    BUN 5 (L) 2021    CR 0.52 2021    GLC 88 2021     COAGS:   Lab Results   Component Value Date    INR 1.08 2021     POC:   Lab Results   Component Value Date     (H) 2021 "     HEPATIC: No results found for: ALBUMIN, PROTTOTAL, ALT, AST, GGT, ALKPHOS, BILITOTAL, BILIDIRECT, EZEQUIEL  OTHER:   Lab Results   Component Value Date    KARINE 8.5 05/26/2021    PHOS 3.1 05/26/2021    MAG 1.8 05/26/2021       Anesthesia Plan    ASA Status:  2      Anesthesia Type: General.     - Airway: ETT   Induction: Intravenous, Propofol.   Maintenance: TIVA.        Consents    Anesthesia Plan(s) and associated risks, benefits, and realistic alternatives discussed. Questions answered and patient/representative(s) expressed understanding.     - Discussed with:  Patient      - Extended Intubation/Ventilatory Support Discussed: No.      - Patient is DNR/DNI Status: No    Use of blood products discussed: No .     Postoperative Care       PONV prophylaxis: Ondansetron (or other 5HT-3), Dexamethasone or Solumedrol     Comments:                Theo Hopkins MD

## 2021-05-28 NOTE — ANESTHESIA POSTPROCEDURE EVALUATION
Patient: Ngoc Azar    Procedure(s):  ESOPHAGOGASTRODUODENOSCOPY, WITH FOREIGN BODY REMOVAL    Diagnosis:Marginal ulcer [K28.9]  Diagnosis Additional Information: No value filed.    Anesthesia Type:  General    Note:  Disposition: Outpatient   Postop Pain Control: Uneventful            Sign Out: Well controlled pain   PONV: No   Neuro/Psych: Uneventful            Sign Out: Acceptable/Baseline neuro status   Airway/Respiratory: Uneventful            Sign Out: Acceptable/Baseline resp. status   CV/Hemodynamics: Uneventful            Sign Out: Acceptable CV status; No obvious hypovolemia; No obvious fluid overload   Other NRE: NONE   DID A NON-ROUTINE EVENT OCCUR? No           Last vitals:  Vitals:    05/28/21 0930 05/28/21 0954 05/28/21 1001   BP: 129/89 137/87 128/80   Pulse: 88 88 84   Resp: 9 15 12   Temp:  36.5  C (97.7  F)    SpO2: 96% 95% 96%       Last vitals prior to Anesthesia Care Transfer:  CRNA VITALS  5/28/2021 0835 - 5/28/2021 0935      5/28/2021             Pulse:  115    Ht Rate:  114    SpO2:  100 %    Resp Rate (observed):  (!) 5          Electronically Signed By: Theo Hopkins MD  May 28, 2021  10:20 AM

## 2021-05-28 NOTE — ANESTHESIA PROCEDURE NOTES
Airway       Patient location during procedure: OR  Staff -        Anesthesiologist:  Theo Hopkins MD       CRNA: Aure Flores APRN CRNA       Performed By: CRNA  Consent for Airway        Urgency: elective  Indications and Patient Condition       Induction type:intravenous       Mask difficulty assessment: 1 - vent by mask    Final Airway Details       Final airway type: endotracheal airway       Successful airway: ETT - single  Endotracheal Airway Details        ETT size (mm): 7.0       Cuffed: yes       Successful intubation technique: direct laryngoscopy       DL Blade Type: MAC 3       Grade View of Cords: 1       Adjucts: stylet       Position: Right       Measured from: gums/teeth       Secured at (cm): 21       Bite block used: None    Post intubation assessment        Placement verified by: capnometry, equal breath sounds and chest rise        Number of attempts at approach: 1       Number of other approaches attempted: 0       Secured with: pink tape       Ease of procedure: easy       Dentition: Intact and Unchanged    Medication(s) Administered   Medication Administration Time: 5/28/2021 7:46 AM

## 2021-05-28 NOTE — PHARMACY-ADMISSION MEDICATION HISTORY
Admission Medication History Completed by Pharmacy    See [facility] Admission Navigator for allergy information, preferred outpatient pharmacy, prior to admission medications and immunization status.     Medication History Sources:     Patient    SureScript    CareEverywhere    Changes made to PTA medication list (reason):    Added: brexpiprazole    Deleted: None    Changed: clarified dosing for acetaminophen, fluoxetine, hydroxyzine, and ferrous sulfate    Additional Information:    Per patient, she has been out of brexpiprazole and fluoxetine for a few days, and last took them on Sunday 5/23.    Prior to Admission medications    Medication Sig Last Dose Taking? Auth Provider   acetaminophen (TYLENOL) 500 MG tablet Take 1,000 mg by mouth every 6 hours as needed for mild pain or fever  PRN Yes Reported, Patient   atenolol (TENORMIN) 50 MG tablet Take 50 mg by mouth daily Past Week Yes Reported, Patient   brexpiprazole (REXULTI) 0.5 MG tablet Take 0.5 mg by mouth every evening Past Week Yes Unknown, Entered By History   ferrous sulfate (FE TABS) 325 (65 Fe) MG EC tablet Take 325 mg by mouth daily  Past Week Yes Reported, Patient   FLUoxetine (PROZAC) 40 MG capsule Take 40 mg by mouth daily  Past Week Yes Reported, Patient   hydrOXYzine (ATARAX) 10 MG tablet Take 10 mg by mouth 2 times daily as needed for anxiety  PRN Yes Reported, Patient   hydrOXYzine (ATARAX) 50 MG tablet Take 50 mg by mouth At Bedtime Past Week Yes Reported, Patient   Multiple Vitamin (DAILY-CHRIS) TABS Take 1 tablet by mouth daily Past Week Yes Reported, Patient   omeprazole (PRILOSEC) 20 MG DR capsule Take 20 mg by mouth 2 times daily Past Week Yes Reported, Patient       Date completed: 05/28/21    Medication history completed by: Jeni Vergara, PharmD, BCPS  5/28/2021 12:54 PM

## 2021-05-28 NOTE — PLAN OF CARE
Activity: independent  Neuros:alert and oriented x 4  Cardiac:denies chest pain  Respiratory:room air  GI/:voiding  Diet:NPO  Skin:clean, dry, intact  Lines/Drains:PIV with fluids

## 2021-05-28 NOTE — PROGRESS NOTES
Patient is doing very well postoperatively and tolerating a diet.  If no evidence of complications should be ready to be discharged tomorrow.  Plan is for management of marginal ulcers with Carafate and proton pump inhibitors.  I can see her in 1 month unless any complications develop in the interim.  Plan for repeat endoscopy in 3 months.  On-call team to staff tomorrow.

## 2021-05-28 NOTE — ANESTHESIA CARE TRANSFER NOTE
Patient: Ngoc Azar    Procedure(s):  ESOPHAGOGASTRODUODENOSCOPY, WITH FOREIGN BODY REMOVAL    Diagnosis: Marginal ulcer [K28.9]  Diagnosis Additional Information: No value filed.    Anesthesia Type:   General     Note:    Oropharynx: oropharynx clear of all foreign objects  Level of Consciousness: awake  Oxygen Supplementation: nasal cannula  Level of Supplemental Oxygen (L/min / FiO2): 4  Independent Airway: airway patency satisfactory and stable  Dentition: dentition unchanged  Vital Signs Stable: post-procedure vital signs reviewed and stable  Report to RN Given: handoff report given  Patient transferred to: PACU  Comments:   -on O2 turned down to 2L O2 via NC in PACU, Pt Spont. breathing, awake & alert, monitors placed, VSS, RN at bedside.        Vitals: (Last set prior to Anesthesia Care Transfer)  CRNA VITALS  5/28/2021 0835 - 5/28/2021 0909      5/28/2021             Pulse:  115    Ht Rate:  114    SpO2:  100 %    Resp Rate (observed):  (!) 5        Electronically Signed By: FAMILIA Lima CRNA  May 28, 2021  9:09 AM

## 2021-05-28 NOTE — OP NOTE
Indications: This is a 55-year-old female who is remotely status post a Eve-en-Y gastric bypass done in [place].  Patient did well for surgery however had issues with a longstanding marginal ulcer.  She was operated at [facility] almost 1 year ago for perforated marginal ulcer.  Patient was recently transferred down for bleeding marginal ulcer.  Yesterday we took the patient to the OR for upper endoscopy.  Upper endoscopy revealed the presence of a marginal ulcer at the gastrojejunal anastomosis with the presence of a suture bundle in close proximity to the ulcer.  This suture bundle appeared to have been placed at the time of the original surgery given the extent of suture within the lumen.  In addition there was some evidence of a monofilament suture at the site of/edge of the ulcer.  The plan today is to perform therapeutic endoscopy using a double channel therapeutic scope to attempt removal of remaining suture.  Our hope is that by removing the nidus for ulcer will be able to have satisfactory healing.  The patient understands the procedure risks and potential complications would like us to proceed.    Preoperative diagnosis: History of bleeding from marginal ulcer at gastrojejunal anastomosis status post Eve-en-Y gastric bypass; status post perforated marginal ulcer    Postoperative diagnosis: Same    Procedure performed: Upper endoscopy with endoscopic removal of endoluminal suture    Surgeon: David Gonzáles MD    Estimated blood loss: Minimal    Details: Under the benefits of general endotracheal anesthesia the patient had a bite block placed.  Timeout was performed.  An adult single-lumen upper endoscope was placed.  The ulcer and suture were visualized.  We then switched out for a therapeutic double-lumen endoscope.  The ulcer was visualized as was suture.  Using the endoscopic graspers and rat-tooth graspers individually we were able to grasp the suture and using the endoscopic scissors  able to cut the suture at its base.  We were able to remove all of what appeared to be a nonabsorbable suture from the field.  We then were able to inspect the ulcer base.  There appeared to be a small fragment of monofilament noninflammatory type suture near the ulcer.  We elected not to aggressively attempt to remove this portion of the suture as it likely represented the site of previous perforation.  The patient tolerated the procedure well and there was no evidence of oropharyngeal or esophageal trauma.  The scope was removed.  The patient was extubated and sent to recovery.

## 2021-05-28 NOTE — PLAN OF CARE
"/89 (BP Location: Left arm)   Pulse 79   Temp 98.2  F (36.8  C) (Oral)   Resp 18   Ht 1.626 m (5' 4\")   Wt 76.7 kg (169 lb 3.2 oz)   SpO2 96%   BMI 29.04 kg/m      Neuro: A&O x4, calls appropriately.   Respiratory: sats mid-high 90's on RA, denies SOB.   Cardiac: WDL, denies cardiac chest pain.   GI/: Voiding spontaneously adequate amounts. No BM this shift, +BS.   Diet: regular diet, tolerating well.   Pain/Nausea: Denies both.   IV Access: R PIV infusing IVMF @ 100  Labs: reviewed.   Activity: up ad yaquelin, ambulated halls today.   Plan: Continue to monitor, possible discharge tomorrow am.     "

## 2021-05-28 NOTE — PLAN OF CARE
"Vital signs:  Temp: 97.8  F (36.6  C) Temp src: Oral BP: (!) 146/89 Pulse: 72   Resp: 16 SpO2: 99 % O2 Device: None (Room air)   Height: 162.6 cm (5' 4\") Weight: 76.7 kg (169 lb 3.2 oz)    Activity: up ad yaquelin  Neuros: WDL, A&Ox4, calls appropriately.   Cardiac: WDL, VSS  Respiratory: WDL, sating well on RA.  GI/: Voiding spontaneously. No BM this shift.   Diet: NPO- no exceptions.   Lines: PIV infusing D5 0.45% NaCl +20 K at 100 mL/hr  Pain/nausea: Denies, IV dilaudid available.   Plan: EGD this AM at 0730.     "

## 2021-05-28 NOTE — BRIEF OP NOTE
[facility]    Brief Operative Note    Pre-operative diagnosis: Marginal ulcer [K28.9]  Post-operative diagnosis Same as pre-operative diagnosis, suture from initial surgery and when perforation was repaired.    Procedure: Procedure(s):  ESOPHAGOGASTRODUODENOSCOPY, WITH FOREIGN BODY REMOVAL  Surgeon: Surgeon(s) and Role:     * David Gonzáles MD - Primary  Anesthesia: Monitor Anesthesia Care   Estimated blood loss: Minimal  Drains: None  Specimens:   ID Type Source Tests Collected by Time Destination   A : Suture Other (specify in comments) Other SURGICAL PATHOLOGY EXAM David Gonzáles MD 5/28/2021  8:57 AM      Findings:   suture from original REYGB and repair of perforation .  Complications: None.  Implants: * No implants in log *      Plan    - regular diet  - carafate  - keep PPI 80 BID  - potentially discharge tomorrow with 40 BID   - Follow up with  in 3 months with MAYCO Bailey MD  General Surgery PGY-1

## 2021-05-28 NOTE — PROGRESS NOTES
Surgery Progress Note  5/27/2021     Subjective:  Has been up to bathroom. Had headache and back pain yesterday, given o.4 mg dilaudid x1 but no pain meds since then. Slept well last night. No abdominal pain, nausea, hot flashes, or chills this morning.    Objective:  Temp:  [96.9  F (36.1  C)-97.8  F (36.6  C)] 97.8  F (36.6  C)  Pulse:  [54-96] 72  Resp:  [0-20] 16  BP: (118-161)/() 146/89  SpO2:  [93 %-100 %] 99 %  I/O last 3 completed shifts:  In: 1713.33 [I.V.:1713.33]  Out: 4000 [Urine:4000]    Gen: Awake, alert, NAD   Resp: NLB on RA  Abd: Soft, non-distended, non-tender  Ext: WWP  ***    BMP  Recent Labs   Lab 05/26/21  2250      POTASSIUM 3.5   CHLORIDE 109   KARINE 8.5   CO2 25   BUN 5*   CR 0.52   GLC 88   MAG 1.8   PHOS 3.1     CBC  Recent Labs   Lab 05/26/21  2250   WBC 5.9   RBC 2.89*   HGB 8.9*   HCT 26.6*   MCV 92   MCH 30.8   MCHC 33.5   RDW 12.0        INR  Recent Labs   Lab 05/26/21  2250   INR 1.08      AST/ALT & Alk PhosNo lab results found in last 7 days.  BiliNo results for input(s): BILITOTAL, DBIL in the last 97285 hours.  Lipase/AmlyaseNo lab results found in last 7 days.    A/P: Ngoc Azar is a 55 year old female with PMH of RNYGB 2011 w/ revision in 2020 was transferred from OSH for marginal ulcer with complex bariatric hx. EGD showed moderate stenosis and a cratered marginal ulcer on jejunal aspect of anastomosis w/ adherent clot and likely underlying vessel extending into sinus cavity near bruce limb. EGD at [facility] on 5/27 showed suture as possible nidus for ulcer.    - Plan for repeat EGD today to remove suture  - PPI gtt  - Strict NPO, IVF  - Pain control      ***

## 2021-05-29 ENCOUNTER — PATIENT OUTREACH (OUTPATIENT)
Dept: CARE COORDINATION | Facility: CLINIC | Age: 56
End: 2021-05-29

## 2021-05-29 VITALS
TEMPERATURE: 96.6 F | SYSTOLIC BLOOD PRESSURE: 132 MMHG | DIASTOLIC BLOOD PRESSURE: 84 MMHG | OXYGEN SATURATION: 99 % | HEART RATE: 56 BPM | BODY MASS INDEX: 28.83 KG/M2 | HEIGHT: 64 IN | WEIGHT: 168.9 LBS | RESPIRATION RATE: 18 BRPM

## 2021-05-29 LAB
ANION GAP SERPL CALCULATED.3IONS-SCNC: 5 MMOL/L (ref 3–14)
BUN SERPL-MCNC: 6 MG/DL (ref 7–30)
CALCIUM SERPL-MCNC: 8.9 MG/DL (ref 8.5–10.1)
CHLORIDE SERPL-SCNC: 106 MMOL/L (ref 94–109)
CO2 SERPL-SCNC: 26 MMOL/L (ref 20–32)
CREAT SERPL-MCNC: 0.69 MG/DL (ref 0.52–1.04)
ERYTHROCYTE [DISTWIDTH] IN BLOOD BY AUTOMATED COUNT: 12.6 % (ref 10–15)
GFR SERPL CREATININE-BSD FRML MDRD: >90 ML/MIN/{1.73_M2}
GLUCOSE SERPL-MCNC: 89 MG/DL (ref 70–99)
HCT VFR BLD AUTO: 28.5 % (ref 35–47)
HGB BLD-MCNC: 9.6 G/DL (ref 11.7–15.7)
MAGNESIUM SERPL-MCNC: 2 MG/DL (ref 1.6–2.3)
MCH RBC QN AUTO: 30.9 PG (ref 26.5–33)
MCHC RBC AUTO-ENTMCNC: 33.7 G/DL (ref 31.5–36.5)
MCV RBC AUTO: 92 FL (ref 78–100)
PHOSPHATE SERPL-MCNC: 3.9 MG/DL (ref 2.5–4.5)
PLATELET # BLD AUTO: 438 10E9/L (ref 150–450)
POTASSIUM SERPL-SCNC: 3.5 MMOL/L (ref 3.4–5.3)
RBC # BLD AUTO: 3.11 10E12/L (ref 3.8–5.2)
SODIUM SERPL-SCNC: 138 MMOL/L (ref 133–144)
WBC # BLD AUTO: 9.3 10E9/L (ref 4–11)

## 2021-05-29 PROCEDURE — 80048 BASIC METABOLIC PNL TOTAL CA: CPT

## 2021-05-29 PROCEDURE — 85027 COMPLETE CBC AUTOMATED: CPT

## 2021-05-29 PROCEDURE — 36415 COLL VENOUS BLD VENIPUNCTURE: CPT

## 2021-05-29 PROCEDURE — 250N000011 HC RX IP 250 OP 636

## 2021-05-29 PROCEDURE — C9113 INJ PANTOPRAZOLE SODIUM, VIA: HCPCS

## 2021-05-29 PROCEDURE — 84100 ASSAY OF PHOSPHORUS: CPT

## 2021-05-29 PROCEDURE — 250N000013 HC RX MED GY IP 250 OP 250 PS 637

## 2021-05-29 PROCEDURE — 83735 ASSAY OF MAGNESIUM: CPT

## 2021-05-29 RX ORDER — PANTOPRAZOLE SODIUM 40 MG/1
40 TABLET, DELAYED RELEASE ORAL 2 TIMES DAILY
Qty: 60 TABLET | Refills: 0 | Status: SHIPPED | OUTPATIENT
Start: 2021-05-29 | End: 2021-06-28

## 2021-05-29 RX ORDER — SUCRALFATE 1 G/1
1 TABLET ORAL 4 TIMES DAILY
Qty: 120 TABLET | Refills: 0 | Status: SHIPPED | OUTPATIENT
Start: 2021-05-29 | End: 2021-06-28

## 2021-05-29 RX ADMIN — SUCRALFATE 1 G: 1 TABLET ORAL at 07:22

## 2021-05-29 ASSESSMENT — ACTIVITIES OF DAILY LIVING (ADL)
ADLS_ACUITY_SCORE: 15

## 2021-05-29 NOTE — PLAN OF CARE
VSS. Patient discharged at 0930 to home.  Belongings sent with patient. PIV removed.   To [facility] for [facility]. AVS signed and printed.

## 2021-05-29 NOTE — DISCHARGE SUMMARY
"[facility]   MIS Discharge Summary    Date of Admission: 5/26/2021  Date of Discharge: 5/29/2021    Admission Diagnosis:  1. Upper GI bleed    Discharge Diagnosis:  1. Marginal ulcre      Consultations:  None    Procedures:  1. Upper GI Endoscopy 5/27/2021 Dr. Gonzáles    Brief HPI:  Ngoc Azar is a 55 year old year old female with a a previous Bruce-en-Y in 2011 and revised in 2020 due to a perorated gastrojejunal anastomosis ulcer and an open cholecystectomy in 2020, who presented to OSH on 5/24 with hematemesis and hematochezia resulting in hypotension, found to have a marginal ulcer at R-E-Y anastomosis. At outside hospital patient did not require blood transfusions. She was started on a PPI drip and had an EGD which showed moderate stenosis and a cratered marginal ulcer on jejunal aspect of the anastomosis with adherent clot and likely underlying vessel extending into sinus cavity near bruce limb. Currently she denies any feelings of chest pain, SOB, dizziness, nausea, vomiting, abdominal pain or diarrhea. She has not had any more episodes of hematochezia or hematemesis.     Hospital Course:  The patient was admitted and underwent an EGD with discovery of the marginal ulcer. The patient tolerated the procedure well. There were no complications. She was started on a PPI and Carafate. Her diet was advanced without issue. On 05/29/21 she was deemed medically and physically safe to discharge to home.   Discharge Physical Exam:  /75 (BP Location: Right arm)   Pulse 60   Temp 99.5  F (37.5  C) (Oral)   Resp 18   Ht 1.626 m (5' 4\")   Wt 76.6 kg (168 lb 14.4 oz)   SpO2 96%   BMI 28.99 kg/m      Sitting up in the chair in no acute distress  Awake, alert and appropriate  Non-labored breathing  Regular rate and rhythm  Abdomen soft, nontender  Extremities warm, well perfused.   Meds:       Review of your medicines      START taking      Dose / Directions "   pantoprazole 40 MG EC tablet  Commonly known as: PROTONIX      Dose: 40 mg  Take 1 tablet (40 mg) by mouth 2 times daily  Quantity: 60 tablet  Refills: 0     sucralfate 1 GM tablet  Commonly known as: CARAFATE      Dose: 1 g  Take 1 tablet (1 g) by mouth 4 times daily  Quantity: 120 tablet  Refills: 0        CONTINUE these medicines which have NOT CHANGED      Dose / Directions   acetaminophen 500 MG tablet  Commonly known as: TYLENOL      Dose: 1,000 mg  Take 1,000 mg by mouth every 6 hours as needed for mild pain or fever  Refills: 0     atenolol 50 MG tablet  Commonly known as: TENORMIN      Dose: 50 mg  Take 50 mg by mouth daily  Refills: 0     Daily-Eva Tabs      Dose: 1 tablet  Take 1 tablet by mouth daily  Refills: 0     ferrous sulfate 325 (65 Fe) MG EC tablet  Commonly known as: FE TABS      Dose: 325 mg  Take 325 mg by mouth daily  Refills: 0     FLUoxetine 40 MG capsule  Commonly known as: PROzac      Dose: 40 mg  Take 40 mg by mouth daily  Refills: 0     * hydrOXYzine 10 MG tablet  Commonly known as: ATARAX      Dose: 10 mg  Take 10 mg by mouth 2 times daily as needed for anxiety  Refills: 0     * hydrOXYzine 50 MG tablet  Commonly known as: ATARAX      Dose: 50 mg  Take 50 mg by mouth At Bedtime  Refills: 0     omeprazole 20 MG DR capsule  Commonly known as: priLOSEC      Dose: 20 mg  Take 20 mg by mouth 2 times daily  Refills: 0     Rexulti 0.5 MG tablet  Generic drug: brexpiprazole      Dose: 0.5 mg  Take 0.5 mg by mouth every evening  Refills: 0         * This list has 2 medication(s) that are the same as other medications prescribed for you. Read the directions carefully, and ask your doctor or other care provider to review them with you.               Where to get your medicines      Some of these will need a paper prescription and others can be bought over the counter. Ask your nurse if you have questions.    Bring a paper prescription for each of these medications    pantoprazole 40 MG EC  tablet    sucralfate 1 GM tablet         Additional instructions:  After Care     Future Labs/Procedures    Activity     Comments:    Your activity upon discharge: activity as tolerated    Diet     Comments:    Follow this diet upon discharge:       Regular Diet Adult    Discharge Instructions     Comments:    DIET  -You may resume regular diet   -We recommend eating slowly, chewing thoroughly, eating small frequent meals throughout the day  -Stay well hydrated.      ACTIVITY  -No lifting restrictions  -No driving while on narcotic analgesics (i.e. Percocet, oxycodone, Vicodin)  -No driving until you are able to fully twist to both sides or slam on brakes quickly and without any pain    WOUND CARE  -Inspect your wounds daily for signs of infection (increased redness, drainage, pain)  -Keep your wound clean and dry  -You may shower, but do not soak in tub or pool      MEDICATIONS  - Do not take any additional Tylenol (acetaminophen) while using a narcotic pain medication which includes acetaminophen  - Do not take more than 4,000mg of acetaminophen in any 24 hour period, as this can cause liver damage  - Take stool softeners such as Senna or Miralax while you are using narcotics, but stop if you develop diarrhea  - Wean yourself off of narcotic pain medications    NOTIFY  Please contact us for problems after discharge such as:  -Temperature > 101F, chills, rigors, dizziness  -Redness around or purulent drainage from wound  -Inability to tolerate diet, nausea or vomiting  -You stop passing gas, develop significant bloating, abdominal pain  -Have blood in stools/vomit  -Have severe diarrhea/constipation  -Any other questions or concerns.  - At nights (after 4:30pm), on weekends, or if urgent, call [phone] and ask the  to speak with the on-call Surgery resident    FOLLOW-UP:  - Call your primary care provider to touch base regarding your recent admission.    - Call or return sooner than your regularly  scheduled visit if you develop any of the following: fever >101.5, uncontrolled pain, uncontrolled nausea or vomiting, as well as increased redness, swelling, or drainage from your wound.   - Please follow up with Dr. Gonzáles in one month and three months with EGD  -  A nurse from the General Surgery Clinic will contact you within 24 hours, or the next business day, after your discharge from the hospital.  If you have questions or to schedule a follow up appointment please call the General Surgery Clinic at [phone].  Call [phone] and ask to speak with the Surgery resident on call if you are having troubles in the evenings, at night, or on the weekend.  -  If you had surgery with Dr Davis or Dr Gonzáles please call [phone] to schedule your follow up appointment or with any questions or concerns.  -  If you are receiving home care please inform your home care nurse of our contact number.                  BARIATRIC PATIENTS:  Call [phone] to schedule or to reach your care team    GENERAL SURGERY PATIENTS: Call [phone] for scheduling needs or to reach your care team

## 2021-05-29 NOTE — PLAN OF CARE
"/75 (BP Location: Right arm)   Pulse 60   Temp 99.5  F (37.5  C) (Oral)   Resp 18   Ht 1.626 m (5' 4\")   Wt 76.6 kg (168 lb 14.4 oz)   SpO2 96%   BMI 28.99 kg/m      [id number]   Status: POD #1 s/p EGD w/ foreign body removal   Neuros: A&O x4, calls appropriately. CMS intact   Cardiac: WDL    Respiratory: WDL on RA   GI/: Voiding spontaneously in room. +Bs, +Flatus. Last BM 5/27   Diet/Nausea: Regular diet, tolerated dinner w/ no nausea    Skin: No overt skin deficits noted   Lines: R PIV infusing IVMF @ 100 mL/hr until ~2300 @ which point pt refused stating she would drink water   Labs: Reviewed   Pain: Denies pain   Activity: Up in room Ad yaquelin   Plan: Probable discharge this AM. Pt states that she has ride set up for 'around 9-930'     "

## 2021-06-01 NOTE — PROGRESS NOTES
Attempted post discharge call  No answer and no voice mail box   Mo further calls will be mace at this time

## 2021-06-03 ENCOUNTER — PATIENT OUTREACH (OUTPATIENT)
Dept: ENDOCRINOLOGY | Facility: CLINIC | Age: 56
End: 2021-06-03

## 2021-06-03 DIAGNOSIS — K28.9 MARGINAL ULCER: Primary | ICD-10-CM

## 2021-06-03 LAB — COPATH REPORT: NORMAL

## 2021-06-03 NOTE — PROGRESS NOTES
Attempted to contact patient regarding future appts at 1 mo and 3 mo and to schedule EGD for 3 months.  Patient does not have a voicemail setup and phone disconnects after message.  Patient does not have MyChart setup either.  Letter sent to patient to call the office to schedule appts and to call Endoscopy to schedule EGD.    Provider notified unable to reach patient.

## 2021-06-03 NOTE — LETTER
Asha 3, 2021      Ngoc Azar  [address]  [place] [address]              Dear Dr. Eliecer Grossman would like to see you in 1 month and in 3 months.  He would also like you to have a repeat EGD in 3 months.  Please call to schedule your appointments.      Office: [phone] - To schedule appointments with Dr. Gonzáles.    Endoscopy: [phone] - To schedule the Upper Endoscopy (EGD).      Sincerely,      Alicia Galvez RN, BSN  RN Care Coordinator  Bariatric Surgery and Comprehensive Weight Management  Phone: [phone]

## 2021-06-08 DIAGNOSIS — K28.9 MARGINAL ULCER: Primary | ICD-10-CM

## 2021-06-08 RX ORDER — CEFAZOLIN SODIUM 2 G/50ML
2 SOLUTION INTRAVENOUS
Status: CANCELLED | OUTPATIENT
Start: 2021-06-08

## 2021-06-08 RX ORDER — CEFAZOLIN SODIUM 2 G/50ML
2 SOLUTION INTRAVENOUS SEE ADMIN INSTRUCTIONS
Status: CANCELLED | OUTPATIENT
Start: 2021-06-08

## 2021-06-09 ENCOUNTER — CARE COORDINATION (OUTPATIENT)
Dept: ENDOCRINOLOGY | Facility: CLINIC | Age: 56
End: 2021-06-09

## 2021-06-09 NOTE — PROGRESS NOTES
Appointment reminder sent to patient for upcoming July appointment with Dr. Gonzáles.  Patient had requested a reminder be mailed to her.

## 2021-07-06 ENCOUNTER — TELEPHONE (OUTPATIENT)
Dept: ENDOCRINOLOGY | Facility: CLINIC | Age: 56
End: 2021-07-06

## 2021-07-06 NOTE — TELEPHONE ENCOUNTER
Patient interested in weight loss surgery    Ngoc Azar    Instructed to check with insurance company regarding exclusions prior to first appt.    Scheduled to see CNP or ALEKSANDRA at 9:00 am, Spoke to Patient:    Left message:128 pm    Can find information on bariatrix products at: [email or web address]    Instructed to view seminar and complete pre-visit questionnaire prior to visit at [email or web address].    [phone] contact center phone number      KLAUS CALDERON, CANDE

## 2021-07-21 ENCOUNTER — TELEPHONE (OUTPATIENT)
Dept: ENDOCRINOLOGY | Facility: CLINIC | Age: 56
End: 2021-07-21

## 2021-07-21 ENCOUNTER — PATIENT OUTREACH (OUTPATIENT)
Dept: ENDOCRINOLOGY | Facility: CLINIC | Age: 56
End: 2021-07-21

## 2021-07-21 NOTE — PROGRESS NOTES
Attempted to call patient to set up appointment.  NO voicemail setup so unable to leave a message.

## 2021-07-21 NOTE — TELEPHONE ENCOUNTER
Attempted to call patient to discuss date for upper endoscopy with Dr. Gonzáles in the OR. Patient's voice mailbox is not set up yet, could not leave message. Will send a letter.

## 2021-08-11 ENCOUNTER — TELEPHONE (OUTPATIENT)
Dept: GASTROENTEROLOGY | Facility: CLINIC | Age: 56
End: 2021-08-11

## 2021-08-11 NOTE — TELEPHONE ENCOUNTER
Unable to leave a VM due to her VM not being set up.     [facility] message sent on 8/6/2021 has not been read. Resending [employer] message.     Order Questions    Question Answer Comment   Procedure: Upper Endoscopy    Upper Endoscopy Type: EGD    Upper Endoscopy Sedation: Conscious/Moderate    Upper Endoscopy Reason for Procedure: GI bleed    Preferred Location: [facility]    Scheduling Instructions: If you have not heard from the scheduling office within 2 business days, please call [phone].           Associated Diagnoses    Marginal ulcer [K28.9]  - Primary

## 2021-09-11 ENCOUNTER — HEALTH MAINTENANCE LETTER (OUTPATIENT)
Age: 56
End: 2021-09-11

## 2022-10-30 ENCOUNTER — HEALTH MAINTENANCE LETTER (OUTPATIENT)
Age: 57
End: 2022-10-30

## 2023-08-17 ENCOUNTER — TRANSCRIBE ORDERS (OUTPATIENT)
Dept: OTHER | Age: 58
End: 2023-08-17

## 2023-08-17 DIAGNOSIS — N88.8 CERVICAL MASS: Primary | ICD-10-CM

## 2023-11-12 ENCOUNTER — HEALTH MAINTENANCE LETTER (OUTPATIENT)
Age: 58
End: 2023-11-12

## (undated) DEVICE — ENDO CAP AND TUBING STERILE FOR ENDOGATOR  100130

## (undated) DEVICE — SOL WATER IRRIG 1000ML BOTTLE 2F7114

## (undated) DEVICE — GLOVE PROTEXIS POWDER FREE SMT 7.5  2D72PT75X

## (undated) DEVICE — LINEN TOWEL PACK X5 5464

## (undated) DEVICE — ENDO FORCEP ENDOJAW BIOPSY 2.8MMX230CM FB-220U

## (undated) DEVICE — BASIN SET SINGLE STERILE 13752-624

## (undated) DEVICE — JELLY LUBRICATING SURGILUBE 2OZ TUBE

## (undated) DEVICE — KIT CONNECTOR FOR OLYMPUS ENDOSCOPES DEFENDO 100310

## (undated) DEVICE — Device

## (undated) DEVICE — TUBING SUCTION 10'X3/16" N510

## (undated) DEVICE — DRAPE MAYO STAND 23X54 8337

## (undated) DEVICE — ENSIZOR ENDOSCOPIC SCISSORS

## (undated) DEVICE — GLOVE PROTEXIS BLUE W/NEU-THERA 7.5  2D73EB75

## (undated) DEVICE — KIT ENDO FIRST STEP DISINFECTANT 200ML W/POUCH EP-4

## (undated) DEVICE — ENDO SYSTEM WATER BOTTLE & TUBING W/CO2 FILTER 00711549

## (undated) DEVICE — PACK GOWN 3/PK DISP XL SBA32GPFCB

## (undated) RX ORDER — EPINEPHRINE 1 MG/ML
INJECTION, SOLUTION INTRAMUSCULAR; SUBCUTANEOUS
Status: DISPENSED
Start: 2021-05-28

## (undated) RX ORDER — PROPOFOL 10 MG/ML
INJECTION, EMULSION INTRAVENOUS
Status: DISPENSED
Start: 2021-05-28

## (undated) RX ORDER — EPINEPHRINE IN SOD CHLOR,ISO 1 MG/10 ML
SYRINGE (ML) INTRAVENOUS
Status: DISPENSED
Start: 2021-05-27

## (undated) RX ORDER — FENTANYL CITRATE 50 UG/ML
INJECTION, SOLUTION INTRAMUSCULAR; INTRAVENOUS
Status: DISPENSED
Start: 2021-05-27

## (undated) RX ORDER — DEXAMETHASONE SODIUM PHOSPHATE 4 MG/ML
INJECTION, SOLUTION INTRA-ARTICULAR; INTRALESIONAL; INTRAMUSCULAR; INTRAVENOUS; SOFT TISSUE
Status: DISPENSED
Start: 2021-05-28

## (undated) RX ORDER — ONDANSETRON 2 MG/ML
INJECTION INTRAMUSCULAR; INTRAVENOUS
Status: DISPENSED
Start: 2021-05-28

## (undated) RX ORDER — FENTANYL CITRATE 50 UG/ML
INJECTION, SOLUTION INTRAMUSCULAR; INTRAVENOUS
Status: DISPENSED
Start: 2021-05-28